# Patient Record
Sex: MALE | Race: WHITE | URBAN - METROPOLITAN AREA
[De-identification: names, ages, dates, MRNs, and addresses within clinical notes are randomized per-mention and may not be internally consistent; named-entity substitution may affect disease eponyms.]

---

## 2023-06-07 ENCOUNTER — WEB ENCOUNTER (OUTPATIENT)
Dept: URBAN - METROPOLITAN AREA CLINIC 54 | Facility: CLINIC | Age: 43
End: 2023-06-07

## 2023-06-07 ENCOUNTER — OFFICE VISIT (OUTPATIENT)
Dept: URBAN - METROPOLITAN AREA CLINIC 54 | Facility: CLINIC | Age: 43
End: 2023-06-07
Payer: COMMERCIAL

## 2023-06-07 ENCOUNTER — DASHBOARD ENCOUNTERS (OUTPATIENT)
Age: 43
End: 2023-06-07

## 2023-06-07 ENCOUNTER — LAB OUTSIDE AN ENCOUNTER (OUTPATIENT)
Dept: URBAN - METROPOLITAN AREA CLINIC 54 | Facility: CLINIC | Age: 43
End: 2023-06-07

## 2023-06-07 VITALS
TEMPERATURE: 97.9 F | BODY MASS INDEX: 28.04 KG/M2 | HEIGHT: 68 IN | HEART RATE: 74 BPM | WEIGHT: 185 LBS | DIASTOLIC BLOOD PRESSURE: 78 MMHG | SYSTOLIC BLOOD PRESSURE: 120 MMHG

## 2023-06-07 DIAGNOSIS — K52.9 RIGHT SIDED COLITIS: ICD-10-CM

## 2023-06-07 PROCEDURE — 99203 OFFICE O/P NEW LOW 30 MIN: CPT | Performed by: STUDENT IN AN ORGANIZED HEALTH CARE EDUCATION/TRAINING PROGRAM

## 2023-06-07 RX ORDER — CEFDINIR 300 MG/1
AS DIRECTED CAPSULE ORAL
Status: ACTIVE | COMMUNITY

## 2023-06-07 RX ORDER — CYCLOBENZAPRINE HYDROCHLORIDE 5 MG/1
1 TABLET AT BEDTIME AS NEEDED TABLET, FILM COATED ORAL ONCE A DAY
Status: ACTIVE | COMMUNITY

## 2023-06-07 RX ORDER — POLYETHYLENE GLYCOL-3350 AND ELECTROLYTES WITH FLAVOR PACK 240; 5.84; 2.98; 6.72; 22.72 G/278.26G; G/278.26G; G/278.26G; G/278.26G; G/278.26G
4000 ML POWDER, FOR SOLUTION ORAL AS DIRECTED
Qty: 4000 ML | Refills: 0 | OUTPATIENT
Start: 2023-06-07 | End: 2023-06-09

## 2023-06-07 RX ORDER — METRONIDAZOLE 500 MG/1
1 TABLET TABLET ORAL THREE TIMES A DAY
Status: ACTIVE | COMMUNITY

## 2023-06-07 NOTE — HPI-TODAY'S VISIT:
--6/7/2023--  Mr. Jw Hill is a 43-year-old man a history of a cholecystectomy and recent hip replacement who presents today for ER follow-up.  On June 4 evening, patient had a fever of 103.5 Fahrenheit associated with new onset of loose stools.  Patient presented to ER on June 5 where labs were significant for hemoglobin 14.5 and CT abdomen pelvis with IV contrast showed colitis in the proximal transverse colon and hepatic flexure.  No mention of diverticulosis.  Normal vascularity noted.  Patient was given IV antibiotics and discharged on metronidazole and cefdinir.  Patient denies any blood in the stool.  Had 1 episode of abdominal cramping last night.  Patient had a fever of 101.2 last night, approximately 20 hours after his IV antibiotic infusion. No further fevers, diarrhea improved. Of note, patient recently discontinued anticoagulation which he was on postoperatively after hip replacement..

## 2023-06-07 NOTE — EXAM-PHYSICAL EXAM
General: Well appearing. No acute distress. HEENT: Anicteric sclerae Cardiovascular: Normal heart rate Respiratory: Breathing comfortably without conversational dyspnea Abdomen: Non-distended.  Nontender. Skin: without visible rashes on examined areas. Musculoskeletal: No gross muscle wasting.  Ambulates with assistance of cane. Neuro: No gross focal deficits. Alert and oriented. Psych: Appropriate mood and affect.  We will

## 2023-08-04 ENCOUNTER — TELEPHONE ENCOUNTER (OUTPATIENT)
Dept: URBAN - METROPOLITAN AREA CLINIC 54 | Facility: CLINIC | Age: 43
End: 2023-08-04

## 2023-08-07 ENCOUNTER — OFFICE VISIT (OUTPATIENT)
Dept: URBAN - METROPOLITAN AREA SURGERY CENTER 14 | Facility: SURGERY CENTER | Age: 43
End: 2023-08-07
Payer: COMMERCIAL

## 2023-08-07 DIAGNOSIS — R93.3 ABN FINDINGS-GI TRACT: ICD-10-CM

## 2023-08-07 PROCEDURE — 45378 DIAGNOSTIC COLONOSCOPY: CPT | Performed by: STUDENT IN AN ORGANIZED HEALTH CARE EDUCATION/TRAINING PROGRAM

## 2023-08-07 PROCEDURE — G8907 PT DOC NO EVENTS ON DISCHARG: HCPCS | Performed by: STUDENT IN AN ORGANIZED HEALTH CARE EDUCATION/TRAINING PROGRAM

## 2023-08-07 RX ORDER — CEFDINIR 300 MG/1
AS DIRECTED CAPSULE ORAL
Status: ACTIVE | COMMUNITY

## 2023-08-07 RX ORDER — METRONIDAZOLE 500 MG/1
1 TABLET TABLET ORAL THREE TIMES A DAY
Status: ACTIVE | COMMUNITY

## 2023-08-07 RX ORDER — CYCLOBENZAPRINE HYDROCHLORIDE 5 MG/1
1 TABLET AT BEDTIME AS NEEDED TABLET, FILM COATED ORAL ONCE A DAY
Status: ACTIVE | COMMUNITY